# Patient Record
Sex: MALE | Race: BLACK OR AFRICAN AMERICAN | NOT HISPANIC OR LATINO | URBAN - METROPOLITAN AREA
[De-identification: names, ages, dates, MRNs, and addresses within clinical notes are randomized per-mention and may not be internally consistent; named-entity substitution may affect disease eponyms.]

---

## 2022-05-10 RX ORDER — SODIUM CHLORIDE 9 MG/ML
1000 INJECTION, SOLUTION INTRAVENOUS
Refills: 0 | Status: DISCONTINUED | OUTPATIENT
Start: 2022-05-12 | End: 2022-05-12

## 2022-05-11 NOTE — ASU PATIENT PROFILE, ADULT - NS PREOP UNDERSTANDS INFO
No solid food after 6am, water, clear apple juice or Ginger Ale until 12pm. Daughter verbalized understanding/yes

## 2022-05-11 NOTE — ASU PATIENT PROFILE, ADULT - NSICDXPASTMEDICALHX_GEN_ALL_CORE_FT
PAST MEDICAL HISTORY:  DM (diabetes mellitus)     ESRD on dialysis [facility]DM    HTN (hypertension)

## 2022-05-11 NOTE — ASU PATIENT PROFILE, ADULT - NSICDXPASTSURGICALHX_GEN_ALL_CORE_FT
PAST SURGICAL HISTORY:  History of glaucoma surgery    History of surgery AV shunt creation Left arm    S/P cataract surgery

## 2022-05-11 NOTE — ASU PATIENT PROFILE, ADULT - FALL HARM RISK - UNIVERSAL INTERVENTIONS
Bed in lowest position, wheels locked, appropriate side rails in place/Call bell, personal items and telephone in reach/Instruct patient to call for assistance before getting out of bed or chair/Non-slip footwear when patient is out of bed/[place] to call system/Physically safe environment - no spills, clutter or unnecessary equipment/Purposeful Proactive Rounding/Room/bathroom lighting operational, light cord in reach

## 2022-05-12 ENCOUNTER — OUTPATIENT (OUTPATIENT)
Dept: OUTPATIENT SERVICES | Facility: HOSPITAL | Age: 82
LOS: 1 days | Discharge: HOME | End: 2022-05-12
Payer: MEDICARE

## 2022-05-12 VITALS
TEMPERATURE: 97 F | RESPIRATION RATE: 15 BRPM | DIASTOLIC BLOOD PRESSURE: 56 MMHG | OXYGEN SATURATION: 100 % | HEART RATE: 69 BPM | SYSTOLIC BLOOD PRESSURE: 133 MMHG

## 2022-05-12 VITALS
OXYGEN SATURATION: 100 % | HEIGHT: 72 IN | TEMPERATURE: 99 F | RESPIRATION RATE: 16 BRPM | DIASTOLIC BLOOD PRESSURE: 71 MMHG | WEIGHT: 136.69 LBS | HEART RATE: 67 BPM | SYSTOLIC BLOOD PRESSURE: 189 MMHG

## 2022-05-12 LAB
ALBUMIN SERPL ELPH-MCNC: 3.3 G/DL — SIGNIFICANT CHANGE UP (ref 3.3–5)
ALP SERPL-CCNC: 98 U/L — SIGNIFICANT CHANGE UP (ref 40–120)
ALT FLD-CCNC: 18 U/L — SIGNIFICANT CHANGE UP (ref 10–45)
ANION GAP SERPL CALC-SCNC: 8 MMOL/L — SIGNIFICANT CHANGE UP (ref 5–17)
AST SERPL-CCNC: 19 U/L — SIGNIFICANT CHANGE UP (ref 10–40)
BILIRUB SERPL-MCNC: 1.3 MG/DL — HIGH (ref 0.2–1.2)
BUN SERPL-MCNC: 18 MG/DL — SIGNIFICANT CHANGE UP (ref 7–23)
CALCIUM SERPL-MCNC: 10.1 MG/DL — SIGNIFICANT CHANGE UP (ref 8.4–10.5)
CHLORIDE SERPL-SCNC: 104 MMOL/L — SIGNIFICANT CHANGE UP (ref 96–108)
CO2 SERPL-SCNC: 30 MMOL/L — SIGNIFICANT CHANGE UP (ref 22–31)
CREAT SERPL-MCNC: 3.9 MG/DL — HIGH (ref 0.5–1.3)
EGFR: 15 ML/MIN/1.73M2 — LOW
GLUCOSE BLDC GLUCOMTR-MCNC: 155 MG/DL — HIGH (ref 70–99)
GLUCOSE BLDC GLUCOMTR-MCNC: 171 MG/DL — HIGH (ref 70–99)
GLUCOSE SERPL-MCNC: 217 MG/DL — HIGH (ref 70–99)
POTASSIUM SERPL-MCNC: 4.9 MMOL/L — SIGNIFICANT CHANGE UP (ref 3.5–5.3)
POTASSIUM SERPL-SCNC: 4.9 MMOL/L — SIGNIFICANT CHANGE UP (ref 3.5–5.3)
PROT SERPL-MCNC: 6.9 G/DL — SIGNIFICANT CHANGE UP (ref 6–8.3)
SODIUM SERPL-SCNC: 142 MMOL/L — SIGNIFICANT CHANGE UP (ref 135–145)

## 2022-05-12 PROCEDURE — 65400 REMOVAL OF EYE LESION: CPT | Mod: LT

## 2022-05-12 PROCEDURE — 65436 CURETTE/TREAT CORNEA: CPT | Mod: LT

## 2022-05-12 RX ORDER — APIXABAN 2.5 MG/1
0 TABLET, FILM COATED ORAL
Qty: 0 | Refills: 0 | DISCHARGE

## 2022-05-12 RX ORDER — SODIUM CHLORIDE 9 MG/ML
1000 INJECTION, SOLUTION INTRAVENOUS
Refills: 0 | Status: DISCONTINUED | OUTPATIENT
Start: 2022-05-12 | End: 2022-05-12

## 2022-05-12 RX ORDER — DORZOLAMIDE HYDROCHLORIDE TIMOLOL MALEATE 20; 5 MG/ML; MG/ML
0 SOLUTION/ DROPS OPHTHALMIC
Qty: 0 | Refills: 0 | DISCHARGE

## 2022-05-12 RX ORDER — OFLOXACIN 0.3 %
1 DROPS OPHTHALMIC (EYE)
Refills: 0 | Status: COMPLETED | OUTPATIENT
Start: 2022-05-12 | End: 2022-05-12

## 2022-05-12 RX ORDER — ACETAMINOPHEN 500 MG
1000 TABLET ORAL ONCE
Refills: 0 | Status: DISCONTINUED | OUTPATIENT
Start: 2022-05-12 | End: 2022-05-12

## 2022-05-12 RX ORDER — TAMSULOSIN HYDROCHLORIDE 0.4 MG/1
0 CAPSULE ORAL
Qty: 0 | Refills: 0 | DISCHARGE

## 2022-05-12 RX ORDER — LOSARTAN POTASSIUM 100 MG/1
0 TABLET, FILM COATED ORAL
Qty: 0 | Refills: 0 | DISCHARGE

## 2022-05-12 RX ORDER — METOPROLOL TARTRATE 50 MG
0 TABLET ORAL
Qty: 0 | Refills: 0 | DISCHARGE

## 2022-05-12 RX ORDER — ROSUVASTATIN CALCIUM 5 MG/1
0 TABLET ORAL
Qty: 0 | Refills: 0 | DISCHARGE

## 2022-05-12 RX ORDER — LATANOPROST 0.05 MG/ML
0 SOLUTION/ DROPS OPHTHALMIC; TOPICAL
Qty: 0 | Refills: 0 | DISCHARGE

## 2022-05-12 RX ADMIN — Medication 1 DROP(S): at 14:55

## 2022-05-12 RX ADMIN — Medication 1 DROP(S): at 14:49

## 2022-05-12 RX ADMIN — Medication 1 DROP(S): at 14:42

## 2022-05-12 RX ADMIN — Medication 1 DROP(S): at 14:41

## 2022-05-12 NOTE — OPERATIVE REPORT - OPERATIVE RPOSRT DETAILS
SURGEON: Abeba Ji DO    ASSISTANT: Carson Hdz MD    PREOPERATIVE DIAGNOSIS: Band Keratopathy, Left Eye    POSTOPERATIVE DIAGNOSIS: Band Keratopathy, Left Eye    OPERATIVE PROCEDURE: Superficial Keratectomy with EDTA Chelation, Left eye    ESTIMATED BLOOD LOSS: <1 cc    SPECIMEN/TISSUE REMOVED: None    COMPLICATIONS: None    DESCRIPTION OF PROCEDURE:    The Risks and benefits of surgery were discussed with the patient, and informed consent was obtained. The correct operative site was identified and marked. The patient received antibiotic drops preoperatively. The patient was taken to the operating room with monitored anesthesia care. Peribulbar blocks was administered around the eye using a 50/50 mixture of 2% lidocaine and 0.75% Marcaine.  The eye was prepped in a sterile manner using Betadine solution. The patient was covered in sterile drapes leaving the eye exposed. The lashes were covered with tegaderm, and a Ebenezer lid speculum was placed to provide exposure.     A 57 blade was used to perform a superficial keratectomy of the corneal band keratopathy of the left eye. EDTA was applied to the band keratopathy to facility debridement. A opal michelle was used to smooth the corneal surface. A bandage contact lens was placed on the eye. The lid speculum and drapes were carefully removed from the eye. An antibiotic/ steroid ointment was instilled in the eye. The eye was covered with a patch and shield. The patient tolerated the procedure well, without complications, and was taken to the recovery area for 1 hour of supine positioning prior to discharge.    SURGEON: Abeba Ji DO    ASSISTANT: Carson Hdz MD    PREOPERATIVE DIAGNOSIS: Band Keratopathy, Left Eye    POSTOPERATIVE DIAGNOSIS: Band Keratopathy, Left Eye    OPERATIVE PROCEDURE: Superficial Keratectomy with EDTA Chelation, Left eye    ESTIMATED BLOOD LOSS: <1 cc    SPECIMEN/TISSUE REMOVED: None    COMPLICATIONS: None    DESCRIPTION OF PROCEDURE:    The Risks and benefits of surgery were discussed with the patient, and informed consent was obtained. The correct operative site was identified and marked. The patient received antibiotic drops preoperatively. The patient was taken to the operating room with monitored anesthesia care. Retrobulbar blocks was administered around the eye using a 50/50 mixture of 2% lidocaine and 0.75% Marcaine.  The eye was prepped in a sterile manner using Betadine solution. The patient was covered in sterile drapes leaving the eye exposed. The lashes were covered with tegaderm, and a Ebenezer lid speculum was placed to provide exposure.     A 57 blade was used to perform a superficial keratectomy of the corneal band keratopathy of the left eye. EDTA was applied to the band keratopathy to facility debridement. A bandage contact lens was placed on the eye. The lid speculum and drapes were carefully removed from the eye. An antibiotic/ steroid ointment was instilled in the eye. The eye was covered with a patch and shield. The patient tolerated the procedure well, without complications, and was taken to the recovery area for 1 hour of supine positioning prior to discharge.

## (undated) DEVICE — Device

## (undated) DEVICE — PACK ANTERIOR SEGMENT

## (undated) DEVICE — GOWN ROYAL SILK XL

## (undated) DEVICE — SPEAR CELLULOSE 40410

## (undated) DEVICE — NDL RETROBULBAR VISITEC 25X1.5

## (undated) DEVICE — NDL HYPO SAFE 18G X 1.5" (PINK)

## (undated) DEVICE — SYR LUER LOK 5CC

## (undated) DEVICE — ELCTR ERASER BI-P BVL 45DEG 18G

## (undated) DEVICE — GLV 6.5 PROTEXIS (WHITE)

## (undated) DEVICE — BLADE MULTI SIDED MICROSCLECTROMY

## (undated) DEVICE — NDL HYPO SAFE 25G X 5/8" (ORANGE)

## (undated) DEVICE — DRAPE MICROSCOPE KNOB COVER SMALL (2 PCS)

## (undated) DEVICE — ELCTR BIPOLAR CORD J&J 12FT DISP

## (undated) DEVICE — VENODYNE/SCD SLEEVE CALF MEDIUM

## (undated) DEVICE — SUT VICRYL 7-0 12" TG140-8 DA

## (undated) DEVICE — MARKING PEN DEVON DUAL TIP W RULER